# Patient Record
Sex: FEMALE | Race: BLACK OR AFRICAN AMERICAN | NOT HISPANIC OR LATINO | URBAN - METROPOLITAN AREA
[De-identification: names, ages, dates, MRNs, and addresses within clinical notes are randomized per-mention and may not be internally consistent; named-entity substitution may affect disease eponyms.]

---

## 2024-01-28 ENCOUNTER — HOSPITAL ENCOUNTER (INPATIENT)
Facility: MEDICAL CENTER | Age: 64
LOS: 2 days | DRG: 085 | End: 2024-01-31
Attending: EMERGENCY MEDICINE | Admitting: SURGERY
Payer: OTHER MISCELLANEOUS

## 2024-01-28 ENCOUNTER — APPOINTMENT (OUTPATIENT)
Dept: RADIOLOGY | Facility: MEDICAL CENTER | Age: 64
DRG: 085 | End: 2024-01-28
Attending: EMERGENCY MEDICINE
Payer: OTHER MISCELLANEOUS

## 2024-01-28 DIAGNOSIS — S06.5XAA SUBDURAL HEMATOMA (HCC): ICD-10-CM
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
## 2024-01-28 DIAGNOSIS — G44.319 ACUTE POST-TRAUMATIC HEADACHE, NOT INTRACTABLE: ICD-10-CM

## 2024-01-28 DIAGNOSIS — I60.9 SUBARACHNOID HEMORRHAGE (HCC): ICD-10-CM

## 2024-01-28 PROCEDURE — 80074 ACUTE HEPATITIS PANEL: CPT

## 2024-01-28 PROCEDURE — 86706 HEP B SURFACE ANTIBODY: CPT

## 2024-01-28 PROCEDURE — 87340 HEPATITIS B SURFACE AG IA: CPT

## 2024-01-28 PROCEDURE — 86705 HEP B CORE ANTIBODY IGM: CPT

## 2024-01-28 PROCEDURE — 86709 HEPATITIS A IGM ANTIBODY: CPT

## 2024-01-28 PROCEDURE — 86901 BLOOD TYPING SEROLOGIC RH(D): CPT

## 2024-01-28 PROCEDURE — 99291 CRITICAL CARE FIRST HOUR: CPT

## 2024-01-28 PROCEDURE — 36415 COLL VENOUS BLD VENIPUNCTURE: CPT

## 2024-01-28 PROCEDURE — 85025 COMPLETE CBC W/AUTO DIFF WBC: CPT

## 2024-01-28 PROCEDURE — 86900 BLOOD TYPING SEROLOGIC ABO: CPT

## 2024-01-28 PROCEDURE — 84703 CHORIONIC GONADOTROPIN ASSAY: CPT

## 2024-01-28 PROCEDURE — 86803 HEPATITIS C AB TEST: CPT

## 2024-01-29 ENCOUNTER — APPOINTMENT (OUTPATIENT)
Dept: RADIOLOGY | Facility: MEDICAL CENTER | Age: 64
DRG: 085 | End: 2024-01-29
Attending: EMERGENCY MEDICINE
Payer: OTHER MISCELLANEOUS

## 2024-01-29 ENCOUNTER — APPOINTMENT (OUTPATIENT)
Dept: RADIOLOGY | Facility: MEDICAL CENTER | Age: 64
DRG: 085 | End: 2024-01-29
Attending: NURSE PRACTITIONER
Payer: OTHER MISCELLANEOUS

## 2024-01-29 PROBLEM — H54.40 BLIND RIGHT EYE: Status: ACTIVE | Noted: 2024-01-29

## 2024-01-29 PROBLEM — Z89.611 HISTORY OF ABOVE-KNEE AMPUTATION OF RIGHT LOWER EXTREMITY (HCC): Status: ACTIVE | Noted: 2024-01-29

## 2024-01-29 PROBLEM — D69.59 PLATELET DYSFUNCTION DUE TO DRUGS: Status: ACTIVE | Noted: 2024-01-29

## 2024-01-29 PROBLEM — I69.30 HISTORY OF STROKE WITH RESIDUAL EFFECTS: Status: ACTIVE | Noted: 2024-01-29

## 2024-01-29 PROBLEM — N18.6 END STAGE RENAL DISEASE ON DIALYSIS (HCC): Status: ACTIVE | Noted: 2024-01-29

## 2024-01-29 PROBLEM — S78.111A ABOVE KNEE AMPUTATION OF RIGHT LOWER EXTREMITY (HCC): Status: ACTIVE | Noted: 2024-01-29

## 2024-01-29 PROBLEM — E11.9 TYPE 2 DIABETES MELLITUS (HCC): Status: ACTIVE | Noted: 2024-01-29

## 2024-01-29 PROBLEM — S06.5XAA SUBDURAL HEMATOMA, POST-TRAUMATIC (HCC): Status: ACTIVE | Noted: 2024-01-29

## 2024-01-29 PROBLEM — T14.90XA TRAUMA: Status: ACTIVE | Noted: 2024-01-29

## 2024-01-29 PROBLEM — T50.905A PLATELET DYSFUNCTION DUE TO DRUGS: Status: ACTIVE | Noted: 2024-01-29

## 2024-01-29 PROBLEM — Z99.2 END STAGE RENAL DISEASE ON DIALYSIS (HCC): Status: ACTIVE | Noted: 2024-01-29

## 2024-01-29 PROBLEM — I15.1 HYPERTENSION SECONDARY TO OTHER RENAL DISORDERS: Status: ACTIVE | Noted: 2024-01-29

## 2024-01-29 LAB
ABO GROUP BLD: NORMAL
ALBUMIN SERPL BCP-MCNC: 3.7 G/DL (ref 3.2–4.9)
ALBUMIN/GLOB SERPL: 1 G/DL
ALP SERPL-CCNC: 105 U/L (ref 30–99)
ALT SERPL-CCNC: 7 U/L (ref 2–50)
AMORPH CRY #/AREA URNS HPF: PRESENT /HPF
AMPHET UR QL SCN: NEGATIVE
ANION GAP SERPL CALC-SCNC: 17 MMOL/L (ref 7–16)
ANISOCYTOSIS BLD QL SMEAR: ABNORMAL
APPEARANCE UR: CLEAR
APTT PPP: 32.8 SEC (ref 24.7–36)
AST SERPL-CCNC: 13 U/L (ref 12–45)
BACTERIA #/AREA URNS HPF: NEGATIVE /HPF
BARBITURATES UR QL SCN: NEGATIVE
BARCODED ABORH UBTYP: 5100
BARCODED PRD CODE UBPRD: NORMAL
BARCODED UNIT NUM UBUNT: NORMAL
BASOPHILS # BLD AUTO: 0.4 % (ref 0–1.8)
BASOPHILS # BLD: 0.02 K/UL (ref 0–0.12)
BENZODIAZ UR QL SCN: NEGATIVE
BILIRUB SERPL-MCNC: 0.4 MG/DL (ref 0.1–1.5)
BILIRUB UR QL STRIP.AUTO: NEGATIVE
BUN SERPL-MCNC: 35 MG/DL (ref 8–22)
BZE UR QL SCN: NEGATIVE
CALCIUM ALBUM COR SERPL-MCNC: 9.6 MG/DL (ref 8.5–10.5)
CALCIUM SERPL-MCNC: 9.4 MG/DL (ref 8.5–10.5)
CANNABINOIDS UR QL SCN: NEGATIVE
CFT BLD TEG: 4.7 MIN (ref 4.6–9.1)
CFT BLD TEG: 5.4 MIN (ref 4.6–9.1)
CFT P HPASE BLD TEG: 4.4 MIN (ref 4.3–8.3)
CFT P HPASE BLD TEG: 5.6 MIN (ref 4.3–8.3)
CHLORIDE SERPL-SCNC: 99 MMOL/L (ref 96–112)
CLOT ANGLE BLD TEG: 77.3 DEGREES (ref 63–78)
CLOT ANGLE BLD TEG: 77.9 DEGREES (ref 63–78)
CLOT LYSIS 30M P MA LENFR BLD TEG: 0.5 % (ref 0–2.6)
CO2 SERPL-SCNC: 27 MMOL/L (ref 20–33)
COLOR UR: YELLOW
COMMENT 1642: NORMAL
COMPONENT P 8504P: NORMAL
CREAT SERPL-MCNC: 5.26 MG/DL (ref 0.5–1.4)
CT.EXTRINSIC BLD ROTEM: 0.8 MIN (ref 0.8–2.1)
CT.EXTRINSIC BLD ROTEM: 0.9 MIN (ref 0.8–2.1)
EOSINOPHIL # BLD AUTO: 0.13 K/UL (ref 0–0.51)
EOSINOPHIL NFR BLD: 2.8 % (ref 0–6.9)
EPI CELLS #/AREA URNS HPF: ABNORMAL /HPF
ERYTHROCYTE [DISTWIDTH] IN BLOOD BY AUTOMATED COUNT: 68.5 FL (ref 35.9–50)
ETHANOL BLD-MCNC: <10.1 MG/DL
FENTANYL UR QL: NEGATIVE
GFR SERPLBLD CREATININE-BSD FMLA CKD-EPI: 9 ML/MIN/1.73 M 2
GLOBULIN SER CALC-MCNC: 3.8 G/DL (ref 1.9–3.5)
GLUCOSE BLD STRIP.AUTO-MCNC: 107 MG/DL (ref 65–99)
GLUCOSE BLD STRIP.AUTO-MCNC: 152 MG/DL (ref 65–99)
GLUCOSE BLD STRIP.AUTO-MCNC: 161 MG/DL (ref 65–99)
GLUCOSE BLD STRIP.AUTO-MCNC: 79 MG/DL (ref 65–99)
GLUCOSE BLD STRIP.AUTO-MCNC: 98 MG/DL (ref 65–99)
GLUCOSE SERPL-MCNC: 164 MG/DL (ref 65–99)
GLUCOSE UR STRIP.AUTO-MCNC: NEGATIVE MG/DL
HAV IGM SERPL QL IA: NORMAL
HBV SURFACE AB SERPL IA-ACNC: <3.5 MIU/ML (ref 0–10)
HBV SURFACE AG SER QL: NORMAL
HCG SERPL QL: NEGATIVE
HCT VFR BLD AUTO: 28.6 % (ref 37–47)
HCV AB SER QL: NORMAL
HGB BLD-MCNC: 8.7 G/DL (ref 12–16)
HYALINE CASTS #/AREA URNS LPF: ABNORMAL /LPF
HYPOCHROMIA BLD QL SMEAR: ABNORMAL
IMM GRANULOCYTES # BLD AUTO: 0.02 K/UL (ref 0–0.11)
IMM GRANULOCYTES NFR BLD AUTO: 0.4 % (ref 0–0.9)
INR PPP: 1.02 (ref 0.87–1.13)
KETONES UR STRIP.AUTO-MCNC: NEGATIVE MG/DL
LEUKOCYTE ESTERASE UR QL STRIP.AUTO: ABNORMAL
LYMPHOCYTES # BLD AUTO: 0.79 K/UL (ref 1–4.8)
LYMPHOCYTES NFR BLD: 17.1 % (ref 22–41)
MACROCYTES BLD QL SMEAR: ABNORMAL
MAGNESIUM SERPL-MCNC: 2.5 MG/DL (ref 1.5–2.5)
MCF BLD TEG: 60.8 MM (ref 52–69)
MCF BLD TEG: 63.7 MM (ref 52–69)
MCF.PLATELET INHIB BLD ROTEM: 25.3 MM (ref 15–32)
MCF.PLATELET INHIB BLD ROTEM: 26.1 MM (ref 15–32)
MCH RBC QN AUTO: 27.2 PG (ref 27–33)
MCHC RBC AUTO-ENTMCNC: 30.4 G/DL (ref 32.2–35.5)
MCV RBC AUTO: 89.4 FL (ref 81.4–97.8)
METHADONE UR QL SCN: NEGATIVE
MICRO URNS: ABNORMAL
MICROCYTES BLD QL SMEAR: ABNORMAL
MONOCYTES # BLD AUTO: 0.39 K/UL (ref 0–0.85)
MONOCYTES NFR BLD AUTO: 8.4 % (ref 0–13.4)
MORPHOLOGY BLD-IMP: NORMAL
NEUTROPHILS # BLD AUTO: 3.28 K/UL (ref 1.82–7.42)
NEUTROPHILS NFR BLD: 70.9 % (ref 44–72)
NITRITE UR QL STRIP.AUTO: NEGATIVE
NRBC # BLD AUTO: 0 K/UL
NRBC BLD-RTO: 0 /100 WBC (ref 0–0.2)
OPIATES UR QL SCN: NEGATIVE
OVALOCYTES BLD QL SMEAR: NORMAL
OXYCODONE UR QL SCN: NEGATIVE
PA AA BLD-ACNC: 11.3 % (ref 0–11)
PA AA BLD-ACNC: 48.9 % (ref 0–11)
PA ADP BLD-ACNC: 11.7 % (ref 0–17)
PA ADP BLD-ACNC: 12.7 % (ref 0–17)
PCP UR QL SCN: NEGATIVE
PH UR STRIP.AUTO: 8 [PH] (ref 5–8)
PHOSPHATE SERPL-MCNC: 7 MG/DL (ref 2.5–4.5)
PLATELET # BLD AUTO: 136 K/UL (ref 164–446)
PLATELET BLD QL SMEAR: NORMAL
PMV BLD AUTO: 9.5 FL (ref 9–12.9)
POIKILOCYTOSIS BLD QL SMEAR: NORMAL
POTASSIUM SERPL-SCNC: 3.7 MMOL/L (ref 3.6–5.5)
PRODUCT TYPE UPROD: NORMAL
PROPOXYPH UR QL SCN: NEGATIVE
PROT SERPL-MCNC: 7.5 G/DL (ref 6–8.2)
PROT UR QL STRIP: 300 MG/DL
PROTHROMBIN TIME: 13.5 SEC (ref 12–14.6)
RBC # BLD AUTO: 3.2 M/UL (ref 4.2–5.4)
RBC # URNS HPF: ABNORMAL /HPF
RBC BLD AUTO: PRESENT
RBC UR QL AUTO: NEGATIVE
RENAL EPI CELLS #/AREA URNS HPF: ABNORMAL /HPF
RH BLD: NORMAL
SCHISTOCYTES BLD QL SMEAR: NORMAL
SODIUM SERPL-SCNC: 143 MMOL/L (ref 135–145)
SP GR UR STRIP.AUTO: 1.01
TEG ALGORITHM TGALG: ABNORMAL
TEG ALGORITHM TGALG: ABNORMAL
UNIT STATUS USTAT: NORMAL
UROBILINOGEN UR STRIP.AUTO-MCNC: 0.2 MG/DL
WBC # BLD AUTO: 4.6 K/UL (ref 4.8–10.8)
WBC #/AREA URNS HPF: ABNORMAL /HPF

## 2024-01-29 PROCEDURE — P9034 PLATELETS, PHERESIS: HCPCS

## 2024-01-29 PROCEDURE — 5A1D70Z PERFORMANCE OF URINARY FILTRATION, INTERMITTENT, LESS THAN 6 HOURS PER DAY: ICD-10-PCS | Performed by: INTERNAL MEDICINE

## 2024-01-29 PROCEDURE — 99222 1ST HOSP IP/OBS MODERATE 55: CPT | Performed by: SURGERY

## 2024-01-29 PROCEDURE — 97530 THERAPEUTIC ACTIVITIES: CPT

## 2024-01-29 PROCEDURE — 30233R1 TRANSFUSION OF NONAUTOLOGOUS PLATELETS INTO PERIPHERAL VEIN, PERCUTANEOUS APPROACH: ICD-10-PCS | Performed by: INTERNAL MEDICINE

## 2024-01-29 PROCEDURE — 71045 X-RAY EXAM CHEST 1 VIEW: CPT

## 2024-01-29 PROCEDURE — 85610 PROTHROMBIN TIME: CPT

## 2024-01-29 PROCEDURE — 84100 ASSAY OF PHOSPHORUS: CPT

## 2024-01-29 PROCEDURE — A9270 NON-COVERED ITEM OR SERVICE: HCPCS | Performed by: SURGERY

## 2024-01-29 PROCEDURE — 700102 HCHG RX REV CODE 250 W/ 637 OVERRIDE(OP): Performed by: NURSE PRACTITIONER

## 2024-01-29 PROCEDURE — 700111 HCHG RX REV CODE 636 W/ 250 OVERRIDE (IP): Performed by: SURGERY

## 2024-01-29 PROCEDURE — 700102 HCHG RX REV CODE 250 W/ 637 OVERRIDE(OP): Performed by: SURGERY

## 2024-01-29 PROCEDURE — 83735 ASSAY OF MAGNESIUM: CPT

## 2024-01-29 PROCEDURE — 770022 HCHG ROOM/CARE - ICU (200)

## 2024-01-29 PROCEDURE — 85730 THROMBOPLASTIN TIME PARTIAL: CPT

## 2024-01-29 PROCEDURE — 96375 TX/PRO/DX INJ NEW DRUG ADDON: CPT

## 2024-01-29 PROCEDURE — 96374 THER/PROPH/DIAG INJ IV PUSH: CPT

## 2024-01-29 PROCEDURE — 72125 CT NECK SPINE W/O DYE: CPT

## 2024-01-29 PROCEDURE — 36415 COLL VENOUS BLD VENIPUNCTURE: CPT

## 2024-01-29 PROCEDURE — 97163 PT EVAL HIGH COMPLEX 45 MIN: CPT

## 2024-01-29 PROCEDURE — 80307 DRUG TEST PRSMV CHEM ANLYZR: CPT

## 2024-01-29 PROCEDURE — 36430 TRANSFUSION BLD/BLD COMPNT: CPT

## 2024-01-29 PROCEDURE — 700111 HCHG RX REV CODE 636 W/ 250 OVERRIDE (IP): Mod: JZ | Performed by: EMERGENCY MEDICINE

## 2024-01-29 PROCEDURE — 70450 CT HEAD/BRAIN W/O DYE: CPT

## 2024-01-29 PROCEDURE — 97167 OT EVAL HIGH COMPLEX 60 MIN: CPT

## 2024-01-29 PROCEDURE — 81001 URINALYSIS AUTO W/SCOPE: CPT

## 2024-01-29 PROCEDURE — 85384 FIBRINOGEN ACTIVITY: CPT | Mod: 91

## 2024-01-29 PROCEDURE — A9270 NON-COVERED ITEM OR SERVICE: HCPCS | Performed by: NURSE PRACTITIONER

## 2024-01-29 PROCEDURE — 92610 EVALUATE SWALLOWING FUNCTION: CPT

## 2024-01-29 PROCEDURE — 85347 COAGULATION TIME ACTIVATED: CPT

## 2024-01-29 PROCEDURE — 82962 GLUCOSE BLOOD TEST: CPT

## 2024-01-29 PROCEDURE — 700111 HCHG RX REV CODE 636 W/ 250 OVERRIDE (IP): Performed by: NURSE PRACTITIONER

## 2024-01-29 PROCEDURE — 82077 ASSAY SPEC XCP UR&BREATH IA: CPT

## 2024-01-29 PROCEDURE — 80053 COMPREHEN METABOLIC PANEL: CPT

## 2024-01-29 PROCEDURE — 85576 BLOOD PLATELET AGGREGATION: CPT | Mod: 91

## 2024-01-29 RX ORDER — LORAZEPAM 2 MG/ML
0.5 INJECTION INTRAMUSCULAR ONCE
Status: COMPLETED | OUTPATIENT
Start: 2024-01-29 | End: 2024-01-29

## 2024-01-29 RX ORDER — HYDROCHLOROTHIAZIDE 25 MG/1
25 TABLET ORAL
Status: DISCONTINUED | OUTPATIENT
Start: 2024-01-29 | End: 2024-01-31 | Stop reason: HOSPADM

## 2024-01-29 RX ORDER — AMOXICILLIN 250 MG
1 CAPSULE ORAL NIGHTLY
Status: DISCONTINUED | OUTPATIENT
Start: 2024-01-29 | End: 2024-01-31 | Stop reason: HOSPADM

## 2024-01-29 RX ORDER — PANTOPRAZOLE SODIUM 20 MG/1
20 TABLET, DELAYED RELEASE ORAL DAILY
COMMUNITY

## 2024-01-29 RX ORDER — ONDANSETRON 2 MG/ML
4 INJECTION INTRAMUSCULAR; INTRAVENOUS EVERY 4 HOURS PRN
Status: DISCONTINUED | OUTPATIENT
Start: 2024-01-29 | End: 2024-01-31 | Stop reason: HOSPADM

## 2024-01-29 RX ORDER — SEVELAMER CARBONATE 800 MG/1
1600 TABLET, FILM COATED ORAL
COMMUNITY

## 2024-01-29 RX ORDER — HYDROCHLOROTHIAZIDE 25 MG/1
25 TABLET ORAL DAILY
COMMUNITY

## 2024-01-29 RX ORDER — ONDANSETRON 2 MG/ML
4 INJECTION INTRAMUSCULAR; INTRAVENOUS ONCE
Status: COMPLETED | OUTPATIENT
Start: 2024-01-29 | End: 2024-01-29

## 2024-01-29 RX ORDER — DOCUSATE SODIUM 250 MG
250-500 CAPSULE ORAL
COMMUNITY

## 2024-01-29 RX ORDER — BISACODYL 10 MG
10 SUPPOSITORY, RECTAL RECTAL
Status: DISCONTINUED | OUTPATIENT
Start: 2024-01-29 | End: 2024-01-31 | Stop reason: HOSPADM

## 2024-01-29 RX ORDER — MORPHINE SULFATE 4 MG/ML
4 INJECTION INTRAVENOUS ONCE
Status: COMPLETED | OUTPATIENT
Start: 2024-01-29 | End: 2024-01-29

## 2024-01-29 RX ORDER — LATANOPROST 50 UG/ML
1 SOLUTION/ DROPS OPHTHALMIC EVERY EVENING
Status: DISCONTINUED | OUTPATIENT
Start: 2024-01-29 | End: 2024-01-31 | Stop reason: HOSPADM

## 2024-01-29 RX ORDER — POLYETHYLENE GLYCOL 3350 17 G/17G
1 POWDER, FOR SOLUTION ORAL 2 TIMES DAILY
Status: DISCONTINUED | OUTPATIENT
Start: 2024-01-29 | End: 2024-01-31 | Stop reason: HOSPADM

## 2024-01-29 RX ORDER — FLUCONAZOLE 150 MG/1
150 TABLET ORAL ONCE
Status: ON HOLD | COMMUNITY
End: 2024-01-31

## 2024-01-29 RX ORDER — OXYCODONE HYDROCHLORIDE 5 MG/1
2.5 TABLET ORAL
Status: DISCONTINUED | OUTPATIENT
Start: 2024-01-29 | End: 2024-01-31 | Stop reason: HOSPADM

## 2024-01-29 RX ORDER — ENEMA 19; 7 G/133ML; G/133ML
1 ENEMA RECTAL
Status: DISCONTINUED | OUTPATIENT
Start: 2024-01-29 | End: 2024-01-29

## 2024-01-29 RX ORDER — OXYCODONE HYDROCHLORIDE 5 MG/1
5 TABLET ORAL
Status: DISCONTINUED | OUTPATIENT
Start: 2024-01-29 | End: 2024-01-31 | Stop reason: HOSPADM

## 2024-01-29 RX ORDER — ACETAMINOPHEN 325 MG/1
650 TABLET ORAL EVERY 4 HOURS PRN
Status: DISCONTINUED | OUTPATIENT
Start: 2024-01-29 | End: 2024-01-31 | Stop reason: HOSPADM

## 2024-01-29 RX ORDER — ATORVASTATIN CALCIUM 40 MG/1
40 TABLET, FILM COATED ORAL EVERY EVENING
Status: DISCONTINUED | OUTPATIENT
Start: 2024-01-29 | End: 2024-01-31 | Stop reason: HOSPADM

## 2024-01-29 RX ORDER — ACETAMINOPHEN 500 MG
1000 TABLET ORAL EVERY 6 HOURS PRN
COMMUNITY

## 2024-01-29 RX ORDER — LEVETIRACETAM 500 MG/5ML
500 INJECTION, SOLUTION, CONCENTRATE INTRAVENOUS EVERY 24 HOURS
Status: DISCONTINUED | OUTPATIENT
Start: 2024-01-29 | End: 2024-01-30

## 2024-01-29 RX ORDER — LEVETIRACETAM 500 MG/1
500 TABLET ORAL EVERY 24 HOURS
Status: DISCONTINUED | OUTPATIENT
Start: 2024-01-29 | End: 2024-01-30

## 2024-01-29 RX ORDER — BRIMONIDINE TARTRATE 2 MG/ML
1 SOLUTION/ DROPS OPHTHALMIC EVERY 8 HOURS
Status: DISCONTINUED | OUTPATIENT
Start: 2024-01-29 | End: 2024-01-31 | Stop reason: HOSPADM

## 2024-01-29 RX ORDER — HYDROMORPHONE HYDROCHLORIDE 1 MG/ML
0.25 INJECTION, SOLUTION INTRAMUSCULAR; INTRAVENOUS; SUBCUTANEOUS
Status: DISCONTINUED | OUTPATIENT
Start: 2024-01-29 | End: 2024-01-30

## 2024-01-29 RX ORDER — METOPROLOL SUCCINATE 100 MG/1
100 TABLET, EXTENDED RELEASE ORAL DAILY
COMMUNITY

## 2024-01-29 RX ORDER — DOCUSATE SODIUM 100 MG/1
100 CAPSULE, LIQUID FILLED ORAL 2 TIMES DAILY
Status: DISCONTINUED | OUTPATIENT
Start: 2024-01-29 | End: 2024-01-31 | Stop reason: HOSPADM

## 2024-01-29 RX ORDER — AMLODIPINE BESYLATE 10 MG/1
10 TABLET ORAL DAILY
COMMUNITY

## 2024-01-29 RX ORDER — HYDRALAZINE HYDROCHLORIDE 20 MG/ML
10 INJECTION INTRAMUSCULAR; INTRAVENOUS EVERY 4 HOURS PRN
Status: DISCONTINUED | OUTPATIENT
Start: 2024-01-29 | End: 2024-01-31 | Stop reason: HOSPADM

## 2024-01-29 RX ORDER — METOPROLOL SUCCINATE 25 MG/1
100 TABLET, EXTENDED RELEASE ORAL
Status: DISCONTINUED | OUTPATIENT
Start: 2024-01-29 | End: 2024-01-31 | Stop reason: HOSPADM

## 2024-01-29 RX ORDER — ATORVASTATIN CALCIUM 40 MG/1
40 TABLET, FILM COATED ORAL NIGHTLY
COMMUNITY

## 2024-01-29 RX ORDER — AMOXICILLIN 250 MG
1 CAPSULE ORAL
Status: DISCONTINUED | OUTPATIENT
Start: 2024-01-29 | End: 2024-01-31 | Stop reason: HOSPADM

## 2024-01-29 RX ORDER — CLOPIDOGREL BISULFATE 75 MG/1
75 TABLET ORAL DAILY
Status: ON HOLD | COMMUNITY
End: 2024-01-31

## 2024-01-29 RX ORDER — DORZOLAMIDE HYDROCHLORIDE AND TIMOLOL MALEATE 20; 5 MG/ML; MG/ML
1 SOLUTION/ DROPS OPHTHALMIC 2 TIMES DAILY
COMMUNITY

## 2024-01-29 RX ORDER — ASPIRIN 81 MG/1
81 TABLET, CHEWABLE ORAL DAILY
Status: ON HOLD | COMMUNITY
End: 2024-01-31

## 2024-01-29 RX ORDER — GABAPENTIN 100 MG/1
100 CAPSULE ORAL 3 TIMES DAILY
COMMUNITY

## 2024-01-29 RX ORDER — DORZOLAMIDE HYDROCHLORIDE AND TIMOLOL MALEATE 20; 5 MG/ML; MG/ML
1 SOLUTION/ DROPS OPHTHALMIC 2 TIMES DAILY
Status: DISCONTINUED | OUTPATIENT
Start: 2024-01-29 | End: 2024-01-31 | Stop reason: HOSPADM

## 2024-01-29 RX ORDER — HYDRALAZINE HYDROCHLORIDE 25 MG/1
25 TABLET, FILM COATED ORAL 2 TIMES DAILY
COMMUNITY

## 2024-01-29 RX ORDER — AMLODIPINE BESYLATE 5 MG/1
10 TABLET ORAL
Status: DISCONTINUED | OUTPATIENT
Start: 2024-01-29 | End: 2024-01-31 | Stop reason: HOSPADM

## 2024-01-29 RX ORDER — SEVELAMER CARBONATE 800 MG/1
800 TABLET, FILM COATED ORAL
Status: DISCONTINUED | OUTPATIENT
Start: 2024-01-29 | End: 2024-01-31 | Stop reason: HOSPADM

## 2024-01-29 RX ORDER — ONDANSETRON 4 MG/1
4 TABLET, ORALLY DISINTEGRATING ORAL EVERY 4 HOURS PRN
Status: DISCONTINUED | OUTPATIENT
Start: 2024-01-29 | End: 2024-01-31 | Stop reason: HOSPADM

## 2024-01-29 RX ORDER — BRIMONIDINE TARTRATE 2 MG/ML
1 SOLUTION/ DROPS OPHTHALMIC 3 TIMES DAILY
COMMUNITY

## 2024-01-29 RX ORDER — LATANOPROST 50 UG/ML
1 SOLUTION/ DROPS OPHTHALMIC NIGHTLY
COMMUNITY

## 2024-01-29 RX ORDER — DEXTROSE MONOHYDRATE 25 G/50ML
25 INJECTION, SOLUTION INTRAVENOUS
Status: DISCONTINUED | OUTPATIENT
Start: 2024-01-29 | End: 2024-01-30

## 2024-01-29 RX ORDER — GABAPENTIN 100 MG/1
100 CAPSULE ORAL 3 TIMES DAILY
Status: DISCONTINUED | OUTPATIENT
Start: 2024-01-29 | End: 2024-01-31 | Stop reason: HOSPADM

## 2024-01-29 RX ORDER — POLYETHYLENE GLYCOL 3350 17 G/17G
17 POWDER, FOR SOLUTION ORAL
COMMUNITY

## 2024-01-29 RX ORDER — HYDRALAZINE HYDROCHLORIDE 25 MG/1
25 TABLET, FILM COATED ORAL 2 TIMES DAILY
Status: DISCONTINUED | OUTPATIENT
Start: 2024-01-29 | End: 2024-01-31 | Stop reason: HOSPADM

## 2024-01-29 RX ADMIN — LORAZEPAM 0.5 MG: 2 INJECTION INTRAMUSCULAR; INTRAVENOUS at 21:17

## 2024-01-29 RX ADMIN — HYDRALAZINE HYDROCHLORIDE 25 MG: 25 TABLET ORAL at 14:13

## 2024-01-29 RX ADMIN — DORZOLAMIDE HYDROCHLORIDE AND TIMOLOL MALEATE 1 DROP: 20; 5 SOLUTION/ DROPS OPHTHALMIC at 14:14

## 2024-01-29 RX ADMIN — BRIMONIDINE TARTRATE 1 DROP: 2 SOLUTION OPHTHALMIC at 14:14

## 2024-01-29 RX ADMIN — DOCUSATE SODIUM 100 MG: 100 CAPSULE, LIQUID FILLED ORAL at 17:59

## 2024-01-29 RX ADMIN — LEVETIRACETAM 500 MG: 500 TABLET, FILM COATED ORAL at 17:59

## 2024-01-29 RX ADMIN — AMLODIPINE BESYLATE 10 MG: 10 TABLET ORAL at 14:16

## 2024-01-29 RX ADMIN — LATANOPROST 1 DROP: 50 SOLUTION OPHTHALMIC at 18:25

## 2024-01-29 RX ADMIN — LEVETIRACETAM 500 MG: 500 TABLET, FILM COATED ORAL at 03:07

## 2024-01-29 RX ADMIN — GABAPENTIN 100 MG: 100 CAPSULE ORAL at 17:59

## 2024-01-29 RX ADMIN — ATORVASTATIN CALCIUM 40 MG: 40 TABLET, FILM COATED ORAL at 17:59

## 2024-01-29 RX ADMIN — SEVELAMER CARBONATE 800 MG: 800 TABLET, FILM COATED ORAL at 14:13

## 2024-01-29 RX ADMIN — MORPHINE SULFATE 4 MG: 4 INJECTION, SOLUTION INTRAMUSCULAR; INTRAVENOUS at 01:07

## 2024-01-29 RX ADMIN — DOCUSATE SODIUM 50 MG AND SENNOSIDES 8.6 MG 1 TABLET: 8.6; 5 TABLET, FILM COATED ORAL at 03:07

## 2024-01-29 RX ADMIN — ONDANSETRON 4 MG: 2 INJECTION INTRAMUSCULAR; INTRAVENOUS at 01:07

## 2024-01-29 RX ADMIN — SEVELAMER CARBONATE 800 MG: 800 TABLET, FILM COATED ORAL at 17:59

## 2024-01-29 RX ADMIN — HYDROCHLOROTHIAZIDE 25 MG: 25 TABLET ORAL at 14:14

## 2024-01-29 RX ADMIN — GABAPENTIN 100 MG: 100 CAPSULE ORAL at 14:13

## 2024-01-29 RX ADMIN — HYDRALAZINE HYDROCHLORIDE 10 MG: 20 INJECTION, SOLUTION INTRAMUSCULAR; INTRAVENOUS at 08:51

## 2024-01-29 ASSESSMENT — ACTIVITIES OF DAILY LIVING (ADL): TOILETING: DEPENDENT

## 2024-01-29 ASSESSMENT — ENCOUNTER SYMPTOMS
TREMORS: 0
SHORTNESS OF BREATH: 0
SPEECH CHANGE: 0
FEVER: 0
ABDOMINAL PAIN: 0
HEADACHES: 0
BLURRED VISION: 0
MYALGIAS: 0
VOMITING: 0
COUGH: 0
NAUSEA: 0
CHILLS: 0
TINGLING: 0
SENSORY CHANGE: 0

## 2024-01-29 ASSESSMENT — COGNITIVE AND FUNCTIONAL STATUS - GENERAL
SUGGESTED CMS G CODE MODIFIER MOBILITY: CM
DRESSING REGULAR LOWER BODY CLOTHING: A LOT
DRESSING REGULAR LOWER BODY CLOTHING: A LOT
DRESSING REGULAR UPPER BODY CLOTHING: A LOT
TURNING FROM BACK TO SIDE WHILE IN FLAT BAD: A LOT
MOVING FROM LYING ON BACK TO SITTING ON SIDE OF FLAT BED: UNABLE
TURNING FROM BACK TO SIDE WHILE IN FLAT BAD: A LOT
CLIMB 3 TO 5 STEPS WITH RAILING: TOTAL
PERSONAL GROOMING: A LITTLE
WALKING IN HOSPITAL ROOM: TOTAL
DRESSING REGULAR UPPER BODY CLOTHING: A LOT
TOILETING: A LOT
MOVING TO AND FROM BED TO CHAIR: UNABLE
PERSONAL GROOMING: A LITTLE
DAILY ACTIVITIY SCORE: 14
WALKING IN HOSPITAL ROOM: TOTAL
CLIMB 3 TO 5 STEPS WITH RAILING: TOTAL
MOBILITY SCORE: 7
MOVING FROM LYING ON BACK TO SITTING ON SIDE OF FLAT BED: UNABLE
SUGGESTED CMS G CODE MODIFIER MOBILITY: CM
EATING MEALS: A LITTLE
MOBILITY SCORE: 8
STANDING UP FROM CHAIR USING ARMS: A LOT
TOILETING: A LOT
HELP NEEDED FOR BATHING: A LOT
HELP NEEDED FOR BATHING: A LOT
DAILY ACTIVITIY SCORE: 14
STANDING UP FROM CHAIR USING ARMS: TOTAL
MOVING TO AND FROM BED TO CHAIR: UNABLE
SUGGESTED CMS G CODE MODIFIER DAILY ACTIVITY: CK
SUGGESTED CMS G CODE MODIFIER DAILY ACTIVITY: CK
EATING MEALS: A LITTLE

## 2024-01-29 ASSESSMENT — PAIN DESCRIPTION - PAIN TYPE
TYPE: ACUTE PAIN

## 2024-01-29 ASSESSMENT — PATIENT HEALTH QUESTIONNAIRE - PHQ9
1. LITTLE INTEREST OR PLEASURE IN DOING THINGS: NOT AT ALL
SUM OF ALL RESPONSES TO PHQ9 QUESTIONS 1 AND 2: 0
2. FEELING DOWN, DEPRESSED, IRRITABLE, OR HOPELESS: NOT AT ALL

## 2024-01-29 ASSESSMENT — LIFESTYLE VARIABLES
TOTAL SCORE: 0
ALCOHOL_USE: YES
CONSUMPTION TOTAL: NEGATIVE
AVERAGE NUMBER OF DAYS PER WEEK YOU HAVE A DRINK CONTAINING ALCOHOL: 0
EVER FELT BAD OR GUILTY ABOUT YOUR DRINKING: NO
ON A TYPICAL DAY WHEN YOU DRINK ALCOHOL HOW MANY DRINKS DO YOU HAVE: 0
TOTAL SCORE: 0
HOW MANY TIMES IN THE PAST YEAR HAVE YOU HAD 5 OR MORE DRINKS IN A DAY: 0
TOTAL SCORE: 0
HAVE YOU EVER FELT YOU SHOULD CUT DOWN ON YOUR DRINKING: NO
EVER HAD A DRINK FIRST THING IN THE MORNING TO STEADY YOUR NERVES TO GET RID OF A HANGOVER: NO
DOES PATIENT WANT TO STOP DRINKING: NO
HAVE PEOPLE ANNOYED YOU BY CRITICIZING YOUR DRINKING: NO

## 2024-01-29 ASSESSMENT — COPD QUESTIONNAIRES
HAVE YOU SMOKED AT LEAST 100 CIGARETTES IN YOUR ENTIRE LIFE: NO/DON'T KNOW
DO YOU EVER COUGH UP ANY MUCUS OR PHLEGM?: NO/ONLY WITH OCCASIONAL COLDS OR INFECTIONS
COPD SCREENING SCORE: 2
DURING THE PAST 4 WEEKS HOW MUCH DID YOU FEEL SHORT OF BREATH: NONE/LITTLE OF THE TIME

## 2024-01-29 ASSESSMENT — FIBROSIS 4 INDEX: FIB4 SCORE: 2.24

## 2024-01-29 ASSESSMENT — GAIT ASSESSMENTS: GAIT LEVEL OF ASSIST: UNABLE TO PARTICIPATE

## 2024-01-29 NOTE — ASSESSMENT & PLAN NOTE
Left hemispheric subdural hematoma. Right sylvian and temporal subarachnoid hemorrhage as well as more extensive left frontal subarachnoid hemorrhage.  No significant midline shift.  Repeat head CT with mildly increased size of a left posterior predominant subdural hematoma   Plan repeat head CT in 24-36 hours  Non-operative management.  Post traumatic pharmacologic seizure prophylaxis for 1 week.  Speech Language Pathology cognitive evaluation.  Felix Stevens MD. Neurosurgeon. [facility].
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
## 2024-01-29 NOTE — PROGRESS NOTES
Medication history reviewed with PT at bedside.  PT's Daughter, Grabiel ([phone]) over the phone.  PT's Home Pharmacy [facility] in [place], [place] ([phone]) over the phone.    Med rec is complete per PT, Daughter and [facility] reporting.    Allergies reviewed with Daughter over the phone.    [facility] denies any outpatient antibiotics in the last 30 days.     Patient is not taking anticoagulants.    PT is on dialysis on Tues, Thurs and Sat at [facility] in [place], [place] ([phone]). PT's daughter reports that PT received dialysis on 01/27/2024.    PT's Daughter could only verify that the PT took 5 of her medications (Amlodipine, Aspirin, Atorvastatin, Hydralazine and Metoprolol) on 01/28/2024.  PT would only confirm that she took her eye drops on 01/28/2024. At that time, PT would no longer participate in Med Rec interview and demanded that the RN, PT's sister and this writer leave her room immediately.    Preferred pharmacy for this visit - Unknown
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
## 2024-01-29 NOTE — PROGRESS NOTES
Patient wearing bracelet and ring and did not want them removed.   Ziploc bag containing medications and other rings and necklace sent home with patient's daughter Grabiel.

## 2024-01-29 NOTE — ASSESSMENT & PLAN NOTE
Chronic condition treated with Norvasc, hydralazine and hydrochlorothiazide.  Resumed maintenance medication on admission.  IV prn antihypertensives ordered.
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
## 2024-01-29 NOTE — ED NOTES
Pt with improvement in mentation. Pt AAOx3; disoriented to situation. Pt re-oriented by daughters at bedside.

## 2024-01-29 NOTE — CARE PLAN
Problem: Knowledge Deficit - Standard  Goal: Patient and family/care givers will demonstrate understanding of plan of care, disease process/condition, diagnostic tests and medications  Outcome: Progressing  Note: Explained plan of care to patient and daughter     Problem: Hemodynamics  Goal: Patient's hemodynamics, fluid balance and neurologic status will be stable or improve  Outcome: Progressing   The patient is Stable - Low risk of patient condition declining or worsening    Shift Goals  Clinical Goals: stable neuro exam  Patient Goals: rest  Family Goals: no family present    Progress made toward(s) clinical / shift goals:  neuro exam at baseline, VSS    Patient is not progressing towards the following goals: n/a
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
## 2024-01-29 NOTE — PROGRESS NOTES
Neurosurgery    Reviewed CT with Dr. Stevens- sdh/sah stable  Neuro checks q2  Continue Keppra  No anticoags  Activity as beatriz
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
## 2024-01-29 NOTE — ASSESSMENT & PLAN NOTE
History of, no medications noted in medication reconciliation   Daughter is a primary caregiver.   Insulin sliding scale.
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
## 2024-01-29 NOTE — PROGRESS NOTES
Pt to T931 on on gurney & cardiac monitor w/ RN and CNA.    Weight: 63.5kg  Temp: 97.0F  HR: 60  BP: 145/71  O2: 96% on 1L NC  RR: 14    Pt belongings: wheelchair, dentures, cell phone, credit card, shoes, black purse, snacks, jewelry (x3 rings, necklace)    4 Eyes Skin Assessment Completed by JASON Umana and JASON Cordova.    Head small bruise to R anterior head  Ears WDL  Nose WDL  Mouth WDL  Neck WDL  Breast/Chest WDL  Shoulder Blades WDL  Spine WDL  (R) Arm/Elbow/Hand small abrasion to R FA  (L) Arm/Elbow/Hand WDL, AV fistula  Abdomen scattered scars  Groin WDL  Scrotum/Coccyx/Buttocks healed scar on coccyx  (R) Leg AKA  (L) Leg scattered scars on shin  (R) Heel/Foot/Toe N/A  (L) Heel/Foot/Toe scars on foot          Devices In Places Blood Pressure Cuff, Pulse Ox, SCD's, and Nasal Cannula      Interventions In Place NC W/Ear Foams, Sacral Mepilex, TAP System, Pillows, Q2 Turns, and Low Air Loss Mattress    Possible Skin Injury No    Pictures Uploaded Into Epic N/A  Wound Consult Placed N/A  RN Wound Prevention Protocol Ordered Yes

## 2024-01-29 NOTE — ED NOTES
Straight cath performed, pt tolerated well. Urine collected and sent to lab.     Pt denies any needs at this time. NAD noted. VSS. Call light within reach

## 2024-01-29 NOTE — ASSESSMENT & PLAN NOTE
Hemodialysis on Sat, Tue, and Thursdays in [place].  Left arm fistula.  1/29 Case discussed with Dr. Reyes, will follow
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
## 2024-01-29 NOTE — ED NOTES
ERP at bedside discussing POC/results. Pt denies any needs at this time. NAD noted. Respirations even and unlabored. Bed alarm on, call light within reach

## 2024-01-29 NOTE — PROGRESS NOTES
Home pharmacy entered in med rec. Will need to call in am to get current medications. Patient's daughter is unsure of doses.

## 2024-01-29 NOTE — PROGRESS NOTES
Neurosurgery    Called at 2:02 am and answered immediately. Reviewed CT 2:15 am. GLF in diabetic with hypertension. Has scattered sah in sylvian fissures with small left frontal parietal sdh without significant mass effect.   Patient to icu and follow up ct in am

## 2024-01-29 NOTE — ASSESSMENT & PLAN NOTE
Pt on ASA and plavix  TEG shows ASA inhibition at 41%  Transfused 1 unit platelets  Repeat TEG with improved inhibition   Hold Plavix and ASA per neurosurgery
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
## 2024-01-30 ENCOUNTER — APPOINTMENT (OUTPATIENT)
Dept: RADIOLOGY | Facility: MEDICAL CENTER | Age: 64
DRG: 085 | End: 2024-01-30
Attending: NURSE PRACTITIONER
Payer: OTHER MISCELLANEOUS

## 2024-01-30 ENCOUNTER — HOSPITAL ENCOUNTER (OUTPATIENT)
Dept: RADIOLOGY | Facility: MEDICAL CENTER | Age: 64
End: 2024-01-30
Attending: NURSE PRACTITIONER
Payer: MEDICARE

## 2024-01-30 PROBLEM — D64.9 ANEMIA: Status: ACTIVE | Noted: 2024-01-30

## 2024-01-30 LAB
ALBUMIN SERPL BCP-MCNC: 3.5 G/DL (ref 3.2–4.9)
ALBUMIN/GLOB SERPL: 1.1 G/DL
ALP SERPL-CCNC: 96 U/L (ref 30–99)
ALT SERPL-CCNC: 5 U/L (ref 2–50)
ANION GAP SERPL CALC-SCNC: 12 MMOL/L (ref 7–16)
AST SERPL-CCNC: 7 U/L (ref 12–45)
BASOPHILS # BLD AUTO: 0.3 % (ref 0–1.8)
BASOPHILS # BLD: 0.01 K/UL (ref 0–0.12)
BILIRUB SERPL-MCNC: 0.2 MG/DL (ref 0.1–1.5)
BUN SERPL-MCNC: 50 MG/DL (ref 8–22)
CALCIUM ALBUM COR SERPL-MCNC: 9.2 MG/DL (ref 8.5–10.5)
CALCIUM SERPL-MCNC: 8.8 MG/DL (ref 8.5–10.5)
CHLORIDE SERPL-SCNC: 99 MMOL/L (ref 96–112)
CO2 SERPL-SCNC: 29 MMOL/L (ref 20–33)
CREAT SERPL-MCNC: 6.73 MG/DL (ref 0.5–1.4)
EOSINOPHIL # BLD AUTO: 0.08 K/UL (ref 0–0.51)
EOSINOPHIL NFR BLD: 2.2 % (ref 0–6.9)
ERYTHROCYTE [DISTWIDTH] IN BLOOD BY AUTOMATED COUNT: 69.7 FL (ref 35.9–50)
GFR SERPLBLD CREATININE-BSD FMLA CKD-EPI: 6 ML/MIN/1.73 M 2
GLOBULIN SER CALC-MCNC: 3.1 G/DL (ref 1.9–3.5)
GLUCOSE BLD STRIP.AUTO-MCNC: 118 MG/DL (ref 65–99)
GLUCOSE BLD STRIP.AUTO-MCNC: 155 MG/DL (ref 65–99)
GLUCOSE BLD STRIP.AUTO-MCNC: 71 MG/DL (ref 65–99)
GLUCOSE BLD STRIP.AUTO-MCNC: 76 MG/DL (ref 65–99)
GLUCOSE BLD STRIP.AUTO-MCNC: 91 MG/DL (ref 65–99)
GLUCOSE SERPL-MCNC: 100 MG/DL (ref 65–99)
HCT VFR BLD AUTO: 25.1 % (ref 37–47)
HGB BLD-MCNC: 7.5 G/DL (ref 12–16)
IMM GRANULOCYTES # BLD AUTO: 0.01 K/UL (ref 0–0.11)
IMM GRANULOCYTES NFR BLD AUTO: 0.3 % (ref 0–0.9)
LYMPHOCYTES # BLD AUTO: 0.45 K/UL (ref 1–4.8)
LYMPHOCYTES NFR BLD: 12.5 % (ref 22–41)
MCH RBC QN AUTO: 27.2 PG (ref 27–33)
MCHC RBC AUTO-ENTMCNC: 29.9 G/DL (ref 32.2–35.5)
MCV RBC AUTO: 90.9 FL (ref 81.4–97.8)
MONOCYTES # BLD AUTO: 0.29 K/UL (ref 0–0.85)
MONOCYTES NFR BLD AUTO: 8.1 % (ref 0–13.4)
NEUTROPHILS # BLD AUTO: 2.75 K/UL (ref 1.82–7.42)
NEUTROPHILS NFR BLD: 76.6 % (ref 44–72)
NRBC # BLD AUTO: 0 K/UL
NRBC BLD-RTO: 0 /100 WBC (ref 0–0.2)
PLATELET # BLD AUTO: 159 K/UL (ref 164–446)
PMV BLD AUTO: 10.6 FL (ref 9–12.9)
POTASSIUM SERPL-SCNC: 4.7 MMOL/L (ref 3.6–5.5)
PROT SERPL-MCNC: 6.6 G/DL (ref 6–8.2)
RBC # BLD AUTO: 2.76 M/UL (ref 4.2–5.4)
SODIUM SERPL-SCNC: 140 MMOL/L (ref 135–145)
WBC # BLD AUTO: 3.6 K/UL (ref 4.8–10.8)

## 2024-01-30 PROCEDURE — 700102 HCHG RX REV CODE 250 W/ 637 OVERRIDE(OP): Performed by: SURGERY

## 2024-01-30 PROCEDURE — 93970 EXTREMITY STUDY: CPT

## 2024-01-30 PROCEDURE — 99232 SBSQ HOSP IP/OBS MODERATE 35: CPT | Performed by: NURSE PRACTITIONER

## 2024-01-30 PROCEDURE — 93970 EXTREMITY STUDY: CPT | Mod: 26 | Performed by: INTERNAL MEDICINE

## 2024-01-30 PROCEDURE — A9270 NON-COVERED ITEM OR SERVICE: HCPCS | Performed by: SURGERY

## 2024-01-30 PROCEDURE — 85025 COMPLETE CBC W/AUTO DIFF WBC: CPT

## 2024-01-30 PROCEDURE — 90935 HEMODIALYSIS ONE EVALUATION: CPT

## 2024-01-30 PROCEDURE — 99222 1ST HOSP IP/OBS MODERATE 55: CPT | Performed by: INTERNAL MEDICINE

## 2024-01-30 PROCEDURE — 99222 1ST HOSP IP/OBS MODERATE 55: CPT | Performed by: HOSPITALIST

## 2024-01-30 PROCEDURE — 700102 HCHG RX REV CODE 250 W/ 637 OVERRIDE(OP): Performed by: NURSE PRACTITIONER

## 2024-01-30 PROCEDURE — A9270 NON-COVERED ITEM OR SERVICE: HCPCS | Performed by: NURSE PRACTITIONER

## 2024-01-30 PROCEDURE — 80053 COMPREHEN METABOLIC PANEL: CPT

## 2024-01-30 PROCEDURE — 82962 GLUCOSE BLOOD TEST: CPT | Mod: 91

## 2024-01-30 PROCEDURE — 770001 HCHG ROOM/CARE - MED/SURG/GYN PRIV*

## 2024-01-30 PROCEDURE — 70450 CT HEAD/BRAIN W/O DYE: CPT

## 2024-01-30 RX ORDER — LEVETIRACETAM 500 MG/1
500 TABLET ORAL DAILY
Status: DISCONTINUED | OUTPATIENT
Start: 2024-01-31 | End: 2024-01-31 | Stop reason: HOSPADM

## 2024-01-30 RX ORDER — DEXTROSE MONOHYDRATE 25 G/50ML
25 INJECTION, SOLUTION INTRAVENOUS
Status: DISCONTINUED | OUTPATIENT
Start: 2024-01-30 | End: 2024-01-31 | Stop reason: HOSPADM

## 2024-01-30 RX ORDER — LORAZEPAM 2 MG/ML
0.5 INJECTION INTRAMUSCULAR ONCE
Status: DISCONTINUED | OUTPATIENT
Start: 2024-01-30 | End: 2024-01-30

## 2024-01-30 RX ORDER — LEVETIRACETAM 500 MG/5ML
500 INJECTION, SOLUTION, CONCENTRATE INTRAVENOUS DAILY
Status: DISCONTINUED | OUTPATIENT
Start: 2024-01-31 | End: 2024-01-31 | Stop reason: HOSPADM

## 2024-01-30 RX ADMIN — DORZOLAMIDE HYDROCHLORIDE AND TIMOLOL MALEATE 1 DROP: 20; 5 SOLUTION/ DROPS OPHTHALMIC at 18:07

## 2024-01-30 RX ADMIN — LATANOPROST 1 DROP: 50 SOLUTION OPHTHALMIC at 18:07

## 2024-01-30 RX ADMIN — BRIMONIDINE TARTRATE 1 DROP: 2 SOLUTION OPHTHALMIC at 05:40

## 2024-01-30 RX ADMIN — GABAPENTIN 100 MG: 100 CAPSULE ORAL at 05:38

## 2024-01-30 RX ADMIN — HYDRALAZINE HYDROCHLORIDE 25 MG: 25 TABLET ORAL at 05:38

## 2024-01-30 RX ADMIN — HYDROCHLOROTHIAZIDE 25 MG: 25 TABLET ORAL at 05:38

## 2024-01-30 RX ADMIN — ATORVASTATIN CALCIUM 40 MG: 40 TABLET, FILM COATED ORAL at 18:07

## 2024-01-30 RX ADMIN — SEVELAMER CARBONATE 800 MG: 800 TABLET, FILM COATED ORAL at 18:07

## 2024-01-30 RX ADMIN — DOCUSATE SODIUM 100 MG: 100 CAPSULE, LIQUID FILLED ORAL at 18:07

## 2024-01-30 RX ADMIN — SEVELAMER CARBONATE 800 MG: 800 TABLET, FILM COATED ORAL at 05:38

## 2024-01-30 RX ADMIN — BRIMONIDINE TARTRATE 1 DROP: 2 SOLUTION OPHTHALMIC at 14:08

## 2024-01-30 RX ADMIN — GABAPENTIN 100 MG: 100 CAPSULE ORAL at 18:06

## 2024-01-30 RX ADMIN — DORZOLAMIDE HYDROCHLORIDE AND TIMOLOL MALEATE 1 DROP: 20; 5 SOLUTION/ DROPS OPHTHALMIC at 05:40

## 2024-01-30 RX ADMIN — HYDRALAZINE HYDROCHLORIDE 25 MG: 25 TABLET ORAL at 18:06

## 2024-01-30 RX ADMIN — AMLODIPINE BESYLATE 10 MG: 10 TABLET ORAL at 05:38

## 2024-01-30 RX ADMIN — BRIMONIDINE TARTRATE 1 DROP: 2 SOLUTION OPHTHALMIC at 21:07

## 2024-01-30 ASSESSMENT — PAIN DESCRIPTION - PAIN TYPE
TYPE: ACUTE PAIN

## 2024-01-30 ASSESSMENT — FIBROSIS 4 INDEX: FIB4 SCORE: 1.22

## 2024-01-30 NOTE — ASSESSMENT & PLAN NOTE
I reviewed [facility]    Hypoglycemia protocol    Continue insulin sliding scale monitor

## 2024-01-30 NOTE — PROGRESS NOTES
Neurosurgery Progress Note    Subjective:  Pt sleepy, not appreciative of me bugging her. Denies h/a    Exam:  OE's to voice & stim, no fc's for me but purposeful, EASTMAN's    BP  Min: 102/59  Max: 162/79  Pulse  Av.5  Min: 48  Max: 64  Resp  Avg: 15  Min: 8  Max: 52  Temp  Av.3 °C (97.4 °F)  Min: 35.9 °C (96.7 °F)  Max: 36.8 °C (98.2 °F)  SpO2  Av.4 %  Min: 94 %  Max: 100 %    No data recorded    Recent Labs     24  2345 24  0544   WBC 4.6* 3.6*   RBC 3.20* 2.76*   HEMOGLOBIN 8.7* 7.5*   HEMATOCRIT 28.6* 25.1*   MCV 89.4 90.9   MCH 27.2 27.2   MCHC 30.4* 29.9*   RDW 68.5* 69.7*   PLATELETCT 136* 159*   MPV 9.5 10.6     Recent Labs     24  0111 24  0544   SODIUM 143 140   POTASSIUM 3.7 4.7   CHLORIDE 99 99   CO2 27 29   GLUCOSE 164* 100*   BUN 35* 50*   CREATININE 5.26* 6.73*   CALCIUM 9.4 8.8     Recent Labs     24   APTT 32.8   INR 1.02     Recent Labs     24  0219 24  0800   REACTMIN 5.4 4.7   CLOTKINET 0.9 0.8   CLOTANGL 77.3 77.9   MAXCLOTS 60.8 63.7   [id number]  --  0.5   PRCINADP 12.7 11.7   PRCINAA 48.9* 11.3*       Intake/Output                         24 07 - 24 0659 24 - 24 Total  Total                 Intake    P.O.  60  -- 60  --  -- --    P.O. 60 -- 60 -- -- --    Blood  105  -- 105  --  -- --    Volume (RELEASE PLATELET PHERESIS) 105 -- 105 -- -- --    Total Intake 165 -- 165 -- -- --       Output    Urine  0  -- 0  --  -- --    Number of Times Voided 0 x -- 0 x -- -- --    Urine Void (mL) 0 -- 0 -- -- --    Total Output 0 -- 0 -- -- --       Net I/O     165 -- 165 -- -- --              Intake/Output Summary (Last 24 hours) at 2024 0839  Last data filed at 2024 1800  Gross per 24 hour   Intake 55 ml   Output 0 ml   Net 55 ml             LORazepam  0.5 mg Once    insulin regular  1-6 Units 4X/DAY ACHS    And    dextrose bolus  25 g Q15 MIN PRN     Respiratory Therapy Consult   Continuous RT    Pharmacy Consult Request  1 Each PHARMACY TO DOSE    ondansetron  4 mg Q4HRS PRN    ondansetron  4 mg Q4HRS PRN    docusate sodium  100 mg BID    senna-docusate  1 Tablet Nightly    senna-docusate  1 Tablet Q24HRS PRN    polyethylene glycol/lytes  1 Packet BID    bisacodyl  10 mg Q24HRS PRN    acetaminophen  650 mg Q4HRS PRN    oxyCODONE immediate-release  2.5 mg Q3HRS PRN    Or    oxyCODONE immediate-release  5 mg Q3HRS PRN    Or    HYDROmorphone  0.25 mg Q3HRS PRN    levETIRAcetam  500 mg Q24HRS    Or    levETIRAcetam (Keppra) IV  500 mg Q24HRS    hydrALAZINE  10 mg Q4HRS PRN    brimonidine  1 Drop Q8HRS    dorzolamide-timolol  1 Drop BID    hydrALAZINE  25 mg BID    latanoprost  1 Drop Q EVENING    atorvastatin  40 mg Q EVENING    gabapentin  100 mg TID    metoprolol SR  100 mg Q DAY    amLODIPine  10 mg Q DAY    hydroCHLOROthiazide  25 mg Q DAY    sevelamer carbonate  800 mg TID WITH MEALS    lidocaine  1 mL PRN       Assessment and Plan:  POD #0 bilateral subarachnoid hemorrhage with a small subdural on the left.   NM as above  CT today - unchanged, stable by report  Prophylactic anticoagulation: no         Start date/time: will d/w Dr. Stevens

## 2024-01-30 NOTE — CARE PLAN
The patient is Stable - Low risk of patient condition declining or worsening    Shift Goals  Clinical Goals: stable neuro exam, decreased head bleed, maintain BP below goal, safety  Patient Goals: rest, eat food  Family Goals: improve, get better    Progress made toward(s) clinical / shift goals:  stable neuro exam, BP below goal, safety    Patient is not progressing towards the following goals: small increase in ICH       Problem: Pain - Standard  Goal: Alleviation of pain or a reduction in pain to the patient’s comfort goal  Outcome: Progressing  Note: 0-10 pain assessment tool in use to identify pain. Scheduled, PRN and nonpharmacologic measures in place to manage patients pain.        Problem: Risk for Aspiration  Goal: Patient's risk for aspiration will be absent or decrease  Outcome: Progressing  Note: SLP eval for swallow and cog. Pt currently eating dinner without signs of aspiration
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
## 2024-01-30 NOTE — ASSESSMENT & PLAN NOTE
Patient previously on aspirin and Plavix    Evaluated by neurosurgery with conservative management recommended  Repeat head CT reviewed stable  Continue close clinical monitoring and avoid sedating agents  If mental status not improved check EEG

## 2024-01-30 NOTE — PROGRESS NOTES
2100 - PT agitated, wants to go home, family in room, plan of care explained multiple times. PT is adamant that she wants to go home. Talked with family who indicated understanding. Discussed with daughter via phone who is primary caregiver.   Sarah Neff MD, 0.5mg ativan IVP ordered.   PT transferred from bed to chair with out incident.
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
## 2024-01-30 NOTE — THERAPY
Speech Language Therapy Contact Note    Patient Name: Ellen Nuñez  Age:  64 y.o., Sex:  female  Medical Record #: [id number]  Today's Date: 1/30/2024     RN reported poor likelihood of arousal 2/2 medication. Pt asleep upon arrival and did not awake despite multi-sensory cueing. Pt opened eyes x2 but did not maintain wakefulness. SLP to reattempt for cog eval as appropriate and as time allows.      01/30/24 1129   Treatment Variance   Reason For Missed Therapy Medical - Patient Unarousable;Medical - Patient not Able To Participate   Total Treatment Time   SLP Time Spent Yes   SLP Missed Visit (Mins) 5   Interdisciplinary Plan of Care Collaboration   IDT Collaboration with  Nursing;Speech Therapist   Collaboration Comments per RN, pt unarousable.     Shea Frankenberger, SLP Student
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
## 2024-01-30 NOTE — CARE PLAN
"The patient is Stable - Low risk of patient condition declining or worsening    Shift Goals  Clinical Goals: Repeat CT, Hemodynamic stability  Patient Goals: \"I want to go home\"  Family Goals: Safety, comfort.    Progress made toward(s) clinical / shift goals:  Repeat CT head stable, remains hemodynamically stable, free of injury      Problem: Pain - Standard  Goal: Alleviation of pain or a reduction in pain to the patient’s comfort goal  Outcome: Progressing     Problem: Knowledge Deficit - Standard  Goal: Patient and family/care givers will demonstrate understanding of plan of care, disease process/condition, diagnostic tests and medications  Outcome: Progressing     Problem: Psychosocial  Goal: Patient's level of anxiety will decrease  Outcome: Progressing  Goal: Patient's ability to verbalize feelings about condition will improve  Outcome: Progressing  Goal: Patient's ability to re-evaluate and adapt role responsibilities will improve  Outcome: Progressing  Goal: Patient and family will demonstrate ability to cope with life altering diagnosis and/or procedure  Outcome: Progressing  Goal: Spiritual and cultural needs incorporated into hospitalization  Outcome: Progressing     Problem: Hemodynamics  Goal: Patient's hemodynamics, fluid balance and neurologic status will be stable or improve  Outcome: Progressing     Problem: Respiratory  Goal: Patient will achieve/maintain optimum respiratory ventilation and gas exchange  Outcome: Progressing     Problem: Risk for Aspiration  Goal: Patient's risk for aspiration will be absent or decrease  Outcome: Progressing     Problem: Urinary - Renal Perfusion  Goal: Ability to achieve and maintain adequate renal perfusion and functioning will improve  Outcome: Progressing     Problem: Venous Thromboembolism (VTE) Prevention  Goal: The patient will remain free from venous thromboembolism (VTE)  Outcome: Progressing     Problem: Nutrition  Goal: Patient's nutritional and fluid " intake will be adequate or improve  Outcome: Progressing  Goal: Enteral nutrition will be maintained or improve  Outcome: Progressing  Goal: Enteral nutrition will be maintained or improve  Outcome: Progressing     Problem: Urinary Elimination  Goal: Establish and maintain regular urinary output  Outcome: Progressing     Problem: Bowel Elimination  Goal: Establish and maintain regular bowel function  Outcome: Progressing     Problem: Skin Integrity  Goal: Skin integrity is maintained or improved  Outcome: Progressing     Problem: Fall Risk  Goal: Patient will remain free from falls  Outcome: Progressing
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
## 2024-01-30 NOTE — ASSESSMENT & PLAN NOTE
Continue amlodipine metoprolol hydrochlorothiazide  Monitor blood pressure and adjust accordingly  As needed IV hydralazine for SBP greater than 160

## 2024-01-31 ENCOUNTER — PHARMACY VISIT (OUTPATIENT)
Dept: PHARMACY | Facility: MEDICAL CENTER | Age: 64
End: 2024-01-31
Payer: COMMERCIAL

## 2024-01-31 VITALS
TEMPERATURE: 97.9 F | BODY MASS INDEX: 20.78 KG/M2 | HEART RATE: 62 BPM | SYSTOLIC BLOOD PRESSURE: 103 MMHG | HEIGHT: 68 IN | OXYGEN SATURATION: 92 % | WEIGHT: 137.13 LBS | DIASTOLIC BLOOD PRESSURE: 60 MMHG | RESPIRATION RATE: 18 BRPM

## 2024-01-31 LAB
ALBUMIN SERPL BCP-MCNC: 3.5 G/DL (ref 3.2–4.9)
ALBUMIN/GLOB SERPL: 1.1 G/DL
ALP SERPL-CCNC: 98 U/L (ref 30–99)
ALT SERPL-CCNC: 6 U/L (ref 2–50)
ANION GAP SERPL CALC-SCNC: 12 MMOL/L (ref 7–16)
AST SERPL-CCNC: 16 U/L (ref 12–45)
BASOPHILS # BLD AUTO: 0.3 % (ref 0–1.8)
BASOPHILS # BLD: 0.02 K/UL (ref 0–0.12)
BILIRUB SERPL-MCNC: 0.3 MG/DL (ref 0.1–1.5)
BUN SERPL-MCNC: 31 MG/DL (ref 8–22)
CALCIUM ALBUM COR SERPL-MCNC: 9.4 MG/DL (ref 8.5–10.5)
CALCIUM SERPL-MCNC: 9 MG/DL (ref 8.5–10.5)
CHLORIDE SERPL-SCNC: 100 MMOL/L (ref 96–112)
CO2 SERPL-SCNC: 28 MMOL/L (ref 20–33)
CREAT SERPL-MCNC: 4.26 MG/DL (ref 0.5–1.4)
EOSINOPHIL # BLD AUTO: 0.05 K/UL (ref 0–0.51)
EOSINOPHIL NFR BLD: 0.6 % (ref 0–6.9)
ERYTHROCYTE [DISTWIDTH] IN BLOOD BY AUTOMATED COUNT: 69.8 FL (ref 35.9–50)
FERRITIN SERPL-MCNC: 1334 NG/ML (ref 10–291)
GFR SERPLBLD CREATININE-BSD FMLA CKD-EPI: 11 ML/MIN/1.73 M 2
GLOBULIN SER CALC-MCNC: 3.3 G/DL (ref 1.9–3.5)
GLUCOSE BLD STRIP.AUTO-MCNC: 102 MG/DL (ref 65–99)
GLUCOSE BLD STRIP.AUTO-MCNC: 95 MG/DL (ref 65–99)
GLUCOSE SERPL-MCNC: 101 MG/DL (ref 65–99)
HCT VFR BLD AUTO: 25.5 % (ref 37–47)
HGB BLD-MCNC: 7.8 G/DL (ref 12–16)
IMM GRANULOCYTES # BLD AUTO: 0.05 K/UL (ref 0–0.11)
IMM GRANULOCYTES NFR BLD AUTO: 0.6 % (ref 0–0.9)
IRON SATN MFR SERPL: 12 % (ref 15–55)
IRON SERPL-MCNC: 20 UG/DL (ref 40–170)
LYMPHOCYTES # BLD AUTO: 0.74 K/UL (ref 1–4.8)
LYMPHOCYTES NFR BLD: 9.3 % (ref 22–41)
MCH RBC QN AUTO: 27.6 PG (ref 27–33)
MCHC RBC AUTO-ENTMCNC: 30.6 G/DL (ref 32.2–35.5)
MCV RBC AUTO: 90.1 FL (ref 81.4–97.8)
MONOCYTES # BLD AUTO: 0.5 K/UL (ref 0–0.85)
MONOCYTES NFR BLD AUTO: 6.3 % (ref 0–13.4)
NEUTROPHILS # BLD AUTO: 6.56 K/UL (ref 1.82–7.42)
NEUTROPHILS NFR BLD: 82.9 % (ref 44–72)
NRBC # BLD AUTO: 0 K/UL
NRBC BLD-RTO: 0 /100 WBC (ref 0–0.2)
PLATELET # BLD AUTO: 134 K/UL (ref 164–446)
PMV BLD AUTO: 10.8 FL (ref 9–12.9)
POTASSIUM SERPL-SCNC: 4.5 MMOL/L (ref 3.6–5.5)
PROT SERPL-MCNC: 6.8 G/DL (ref 6–8.2)
RBC # BLD AUTO: 2.83 M/UL (ref 4.2–5.4)
SODIUM SERPL-SCNC: 140 MMOL/L (ref 135–145)
TEST NAME 95000: NORMAL
TIBC SERPL-MCNC: 161 UG/DL (ref 250–450)
UIBC SERPL-MCNC: 141 UG/DL (ref 110–370)
WBC # BLD AUTO: 7.9 K/UL (ref 4.8–10.8)

## 2024-01-31 PROCEDURE — 80053 COMPREHEN METABOLIC PANEL: CPT

## 2024-01-31 PROCEDURE — RXMED WILLOW AMBULATORY MEDICATION CHARGE: Performed by: STUDENT IN AN ORGANIZED HEALTH CARE EDUCATION/TRAINING PROGRAM

## 2024-01-31 PROCEDURE — 82962 GLUCOSE BLOOD TEST: CPT

## 2024-01-31 PROCEDURE — 99239 HOSP IP/OBS DSCHRG MGMT >30: CPT | Performed by: STUDENT IN AN ORGANIZED HEALTH CARE EDUCATION/TRAINING PROGRAM

## 2024-01-31 PROCEDURE — 700102 HCHG RX REV CODE 250 W/ 637 OVERRIDE(OP): Performed by: NURSE PRACTITIONER

## 2024-01-31 PROCEDURE — 85025 COMPLETE CBC W/AUTO DIFF WBC: CPT

## 2024-01-31 PROCEDURE — 82728 ASSAY OF FERRITIN: CPT

## 2024-01-31 PROCEDURE — A9270 NON-COVERED ITEM OR SERVICE: HCPCS | Performed by: SURGERY

## 2024-01-31 PROCEDURE — 83540 ASSAY OF IRON: CPT

## 2024-01-31 PROCEDURE — 83550 IRON BINDING TEST: CPT

## 2024-01-31 PROCEDURE — 36415 COLL VENOUS BLD VENIPUNCTURE: CPT

## 2024-01-31 PROCEDURE — 700102 HCHG RX REV CODE 250 W/ 637 OVERRIDE(OP): Performed by: SURGERY

## 2024-01-31 PROCEDURE — 700111 HCHG RX REV CODE 636 W/ 250 OVERRIDE (IP): Mod: JZ | Performed by: NURSE PRACTITIONER

## 2024-01-31 PROCEDURE — A9270 NON-COVERED ITEM OR SERVICE: HCPCS | Performed by: NURSE PRACTITIONER

## 2024-01-31 RX ORDER — LEVETIRACETAM 500 MG/1
500 TABLET ORAL DAILY
Qty: 4 TABLET | Refills: 0 | Status: SHIPPED | OUTPATIENT
Start: 2024-02-01 | End: 2024-02-05

## 2024-01-31 RX ADMIN — AMLODIPINE BESYLATE 10 MG: 10 TABLET ORAL at 05:27

## 2024-01-31 RX ADMIN — LEVETIRACETAM 500 MG: 100 INJECTION, SOLUTION, CONCENTRATE INTRAVENOUS at 05:26

## 2024-01-31 RX ADMIN — HYDROCHLOROTHIAZIDE 25 MG: 25 TABLET ORAL at 05:27

## 2024-01-31 RX ADMIN — BRIMONIDINE TARTRATE 1 DROP: 2 SOLUTION OPHTHALMIC at 05:29

## 2024-01-31 RX ADMIN — GABAPENTIN 100 MG: 100 CAPSULE ORAL at 05:28

## 2024-01-31 RX ADMIN — SEVELAMER CARBONATE 800 MG: 800 TABLET, FILM COATED ORAL at 16:49

## 2024-01-31 RX ADMIN — SEVELAMER CARBONATE 800 MG: 800 TABLET, FILM COATED ORAL at 09:20

## 2024-01-31 RX ADMIN — DOCUSATE SODIUM 100 MG: 100 CAPSULE, LIQUID FILLED ORAL at 05:28

## 2024-01-31 RX ADMIN — BRIMONIDINE TARTRATE 1 DROP: 2 SOLUTION OPHTHALMIC at 14:10

## 2024-01-31 RX ADMIN — METOPROLOL SUCCINATE 100 MG: 25 TABLET, EXTENDED RELEASE ORAL at 05:28

## 2024-01-31 RX ADMIN — DORZOLAMIDE HYDROCHLORIDE AND TIMOLOL MALEATE 1 DROP: 20; 5 SOLUTION/ DROPS OPHTHALMIC at 05:29

## 2024-01-31 RX ADMIN — HYDRALAZINE HYDROCHLORIDE 25 MG: 25 TABLET ORAL at 05:28

## 2024-01-31 RX ADMIN — POLYETHYLENE GLYCOL 3350 1 PACKET: 17 POWDER, FOR SOLUTION ORAL at 05:28

## 2024-01-31 RX ADMIN — GABAPENTIN 100 MG: 100 CAPSULE ORAL at 12:15

## 2024-01-31 ASSESSMENT — PAIN DESCRIPTION - PAIN TYPE: TYPE: ACUTE PAIN
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
## 2024-01-31 NOTE — PROGRESS NOTES
4 Eyes Skin Assessment Completed by Carmen Thakkar, RN and JASON Cortez.    Head WDL  Ears WDL  Nose WDL  Mouth WDL  Neck WDL  Breast/Chest WDL  Shoulder Blades WDL  Spine WDL  (R) Arm/Elbow/Hand WDL  (L) Arm/Elbow/Hand WDL  Abdomen WDL  Groin WDL  Scrotum/Coccyx/Buttocks WDL  (R) Leg AKA  (L) Leg Scar  (R) Heel/Foot/Toe AKA  (L) Heel/Foot/Toe Scar          Devices In Places Blood Pressure Cuff      Interventions In Place Pillows and Barrier Cream    Possible Skin Injury No    Pictures Uploaded Into Epic N/A  Wound Consult Placed N/A  RN Wound Prevention Protocol Ordered No

## 2024-01-31 NOTE — CARE PLAN
The patient is Stable - Low risk of patient condition declining or worsening    Shift Goals  Clinical Goals: Dialysis, neuro checks stable  Patient Goals: Sleep  Family Goals: make plan of care to take patient home    Progress made toward(s) clinical / shift goals:    Problem: Pain - Standard  Goal: Alleviation of pain or a reduction in pain to the patient’s comfort goal  Outcome: Progressing     Problem: Knowledge Deficit - Standard  Goal: Patient and family/care givers will demonstrate understanding of plan of care, disease process/condition, diagnostic tests and medications  Outcome: Progressing     Problem: Hemodynamics  Goal: Patient's hemodynamics, fluid balance and neurologic status will be stable or improve  Outcome: Progressing     Problem: Respiratory  Goal: Patient will achieve/maintain optimum respiratory ventilation and gas exchange  Outcome: Progressing     Problem: Skin Integrity  Goal: Skin integrity is maintained or improved  Outcome: Progressing     Problem: Fall Risk  Goal: Patient will remain free from falls  Outcome: Progressing       Patient is not progressing towards the following goals:

## 2024-01-31 NOTE — PROGRESS NOTES
1230: pt agitated, refused to take her breakfast. Pt angry because her daughter, Trudy, left without speaking with her. Assisted pt to call her daughter. Called kitchen for her early lunch tray per pt request. Pt refused taking her medication. Education given . Pt continued to refuse. Trudy acknowledged.  Will CTM.

## 2024-01-31 NOTE — PROGRESS NOTES
Neurosurgery Progress Note    Subjective:  No acute events over night  Sitting up in a chair    Exam:  A/Ox4  Slurry speech  EASTMAN, no focal deficits, right BKA    BP  Min: 108/56  Max: 150/81  Pulse  Av.5  Min: 53  Max: 86  Resp  Av.4  Min: 9  Max: 22  Temp  Av.3 °C (97.3 °F)  Min: 36.1 °C (96.9 °F)  Max: 36.7 °C (98 °F)  SpO2  Av.7 %  Min: 94 %  Max: 100 %    No data recorded    Recent Labs     24  2345 24  0544   WBC 4.6* 3.6*   RBC 3.20* 2.76*   HEMOGLOBIN 8.7* 7.5*   HEMATOCRIT 28.6* 25.1*   MCV 89.4 90.9   MCH 27.2 27.2   MCHC 30.4* 29.9*   RDW 68.5* 69.7*   PLATELETCT 136* 159*   MPV 9.5 10.6       Recent Labs     24  0111 24  0544   SODIUM 143 140   POTASSIUM 3.7 4.7   CHLORIDE 99 99   CO2 27 29   GLUCOSE 164* 100*   BUN 35* 50*   CREATININE 5.26* 6.73*   CALCIUM 9.4 8.8       Recent Labs     24   APTT 32.8   INR 1.02       Recent Labs     24  0219 24  0800   REACTMIN 5.4 4.7   CLOTKINET 0.9 0.8   CLOTANGL 77.3 77.9   MAXCLOTS 60.8 63.7   [id number]  --  0.5   PRCINADP 12.7 11.7   PRCINAA 48.9* 11.3*         Intake/Output                         24 0700 - 24 0659 24 07 - 24 0659      Total  Total                 Intake    P.O.  30  30 60  --  -- --    P.O. 30 30 60 -- -- --    Dialysis  500  -- 500  --  -- --    Dialysis Input (Dialysis Input / Output) 500 -- 500 -- -- --    Total Intake 530 30 560 -- -- --       Output    Urine  --  -- --  --  -- --    Number of Times Voided -- 1 x 1 x -- -- --    Dialysis  1500  -- 1500  --  -- --    Dialysis Output (Dialysis Input / Output) 1500 -- 1500 -- -- --    Stool  --  -- --  --  -- --    Number of Times Stooled 0 x -- 0 x -- -- --    Total Output 1500 -- 1500 -- -- --       Net I/O     -970 30 -940 -- -- --              Intake/Output Summary (Last 24 hours) at 2024 0937  Last data filed at 2024  Gross per 24 hour   Intake  560 ml   Output 1500 ml   Net -940 ml               insulin regular  1-6 Units 4X/DAY ACHS    And    dextrose bolus  25 g Q15 MIN PRN    levETIRAcetam  500 mg DAILY    Or    levETIRAcetam (Keppra) IV  500 mg DAILY    Respiratory Therapy Consult   Continuous RT    Pharmacy Consult Request  1 Each PHARMACY TO DOSE    ondansetron  4 mg Q4HRS PRN    ondansetron  4 mg Q4HRS PRN    docusate sodium  100 mg BID    senna-docusate  1 Tablet Nightly    senna-docusate  1 Tablet Q24HRS PRN    polyethylene glycol/lytes  1 Packet BID    bisacodyl  10 mg Q24HRS PRN    acetaminophen  650 mg Q4HRS PRN    oxyCODONE immediate-release  2.5 mg Q3HRS PRN    Or    oxyCODONE immediate-release  5 mg Q3HRS PRN    hydrALAZINE  10 mg Q4HRS PRN    brimonidine  1 Drop Q8HRS    dorzolamide-timolol  1 Drop BID    hydrALAZINE  25 mg BID    latanoprost  1 Drop Q EVENING    atorvastatin  40 mg Q EVENING    gabapentin  100 mg TID    metoprolol SR  100 mg Q DAY    amLODIPine  10 mg Q DAY    hydroCHLOROthiazide  25 mg Q DAY    sevelamer carbonate  800 mg TID WITH MEALS    lidocaine  1 mL PRN       Assessment and Plan:  POD #0 bilateral subarachnoid hemorrhage with a small subdural on the left.     Prophylactic anticoagulation: no         Start date/time: 7 days after admission    NM as above  CT tyesterday - unchanged, stable by report  Mobilize and work with PT/OT    OK to discharge from our stand point once cleared medically/PT/OT  No ASA, NSAIDs for 10 days

## 2024-01-31 NOTE — PROGRESS NOTES
Pt's lunch tray delivered to pt. Full feed assist. Pt ate couple bites of her grilled chicken and refused eating, falling back to sleep.   Called pt's daughter, Grabiel, and left  regarding d/c plan. Will CTM

## 2024-01-31 NOTE — DISCHARGE INSTRUCTIONS
Diet    Diabetic Diet, renal diet    A Diabetic Diet can help you control your blood glucose, lower your risk of heart disease, improve your blood pressure and maintain a healthy weight.  Eating healthy foods, low in calories, fat and carbohydrates at the same time every day can help control your blood glucose. Carbohydrates can greatly affect your blood glucose levels.  Counting carbs is important to keep your blood glucose at a healthy level, especially if you use insulin or take certain oral diabetes medicines.  Avoid alcohol as it can cause a sudden decrease in blood glucose (hypoglycemia), especially if you use insulin or take certain oral diabetes medicines.    Activity    Resume Your Normal Activity    You may resume your normal activity as tolerated.  Rest as needed.          Discharge Instructions per Eriberto Martinez M.D.    Please follow-up with PCP as outpatient  Follow up with neurosurgery as outpatient  Please hold ASA, plavix, NSAID such as ibuprofen, aleve for at least 10 days. Discuss with PCP and neurosurgery at [facility] to decide when to resume ASA and plavix  Continue Keppra until 2/5/24  Resume dialysis per your routine schedule    Return to ER in the event of new or worsening symptoms. Please note importance of compliance and the patient has agreed to proceed with all medical recommendations and follow up plan indicated above. All medications come with benefits and risks. Risks may include permanent injury or death and these risks can be minimized with close reassessment and monitoring. Please make it to your scheduled follow ups with PCP, neurosurgery

## 2024-01-31 NOTE — THERAPY
Speech Language Therapy Contact Note    Patient Name: Ellen Nuñez  Age:  64 y.o., Sex:  female  Medical Record #: [id number]  Today's Date: 1/31/2024 01/31/24 1115   Treatment Variance   Reason For Missed Therapy Medical - Patient Unarousable   Total Treatment Time   SLP Time Spent Yes   SLP Missed Visit (Mins) 7   Initial Contact Note    Initial Contact Note  Order Received and Verified, Speech Therapy Evaluation in Progress with Full Report to Follow.   Interdisciplinary Plan of Care Collaboration   IDT Collaboration with  Nursing;Family / Caregiver   Collaboration Comments Cleared by RN to see patient for dysarthria/cognitive evaluation. Pt asleep in her wheelchair, unarousable for SLP, SLP student, RN student, and dtr. Daughter reports that pt's performance/participation on cognitive test after just waking up would not be representative. SLP to re-attempt as time allows to complete evaluation.     - Chastity Turcios, CCC-SLP

## 2024-01-31 NOTE — CARE PLAN
"The patient is Stable - Low risk of patient condition declining or worsening    Shift Goals  Clinical Goals: neuro stability; safety; pt satisfaction  Patient Goals: information; rest  Family Goals: claudia    Progress made toward(s) clinical / shift goals:    Pt is confused and irritable; when pt arrived to floor she was verbally hostile and contradictory; this nurse believes that pt needed reassurance that she would be taken seriously and have input into her care before she became cooperative; pt stated that she was unclear about what put her here in the hospital; otherwise oriented; pt allowed finger stick but insulin was a hard \"no\" for her; this nurse will continue to try to please pt w/in reasonable limits and let her know she is safe and her input matters; this nurse told pt that it looks as if she takes very good care of her skin; pt resting w/o observable distress; call bell and pt phone in reach; bed low and locked    Problem: Hemodynamics  Goal: Patient's hemodynamics, fluid balance and neurologic status will be stable or improve  1.  Monitor vital signs, pulse oximetry and cardiac monitor per provider order and/or policy  2.  Maintain blood pressure per provider order  3.  Hemodynamic monitoring per provider order  4.  Manage IV fluids and IV infusions  5.  Monitor intake and output  6.  Daily weights per unit policy or provider order  7.  Assess peripheral pulses and capillary refill  8.  Assess color and body temperature  9.  Position patient for maximum circulation/cardiac output  10. Monitor for signs/symptoms of excessive bleeding  11. Assess mental status, restlessness and changes in level of consciousness  12. Monitor temperature and report fever or hypothermia to provider immediately. Consideration of targeted temperature management.  Outcome: Progressing     Problem: Skin Integrity  Goal: Skin integrity is maintained or improved  1.  Assess and monitor skin integrity, appearance and/or " temperature  2.  Assess risk factors for impaired skin integrity and/or pressures ulcers  3.  Implement precautions to protect skin integrity in collaboration with interdisciplinary team  4.  Implement pressure ulcer prevention protocol if at risk for skin breakdown  5.  Confirm wound care consult if at risk for skin breakdown  6.  Ensure patient use of pressure relieving devices  (Low air loss bed, waffle overlay, heel protectors, ROHO cushion, etc)  Outcome: Progressing

## 2024-01-31 NOTE — PROGRESS NOTES
Pt arrived to unit; oriented to room and call bell; difficulty getting pt in bed initially d/t lack of a slideboard; pt was upset that we didn't have her personal slideboard; pt requested that this nurse read all notes to her starting from the first one; pt was advised that I could answer specific questions but that I did not have time capacity to read her the entire chart; I advised pt of the ease of access to MyChart; pt bed low and locked; pt states she wants ice cream, jello and juan crackers and that she eats what she wants outside the hospital

## 2024-01-31 NOTE — PROGRESS NOTES
0730: Pt refused to take her meds and demanded to get to the chair. This RN assisted pt up to chair with a student. Pivot technique in use. pt was unable to hold her gait up. Staff assist pressed. other staff were unable to go around lifting pt up, so this RN and the student had to put pt on the ground. pt was assisted back to bed. No injury noted. All precautions were in place.     1013 Updated the incident and pt's status to pt's daughter, Grabiel.

## 2024-02-01 NOTE — PROGRESS NOTES
"1720: pt's daughter, Grabiel, came to  the pt, but then refused to take pt home tonight d/t pt was lethargic and responded to voice only. Notified charge RN, Richelle    1800: Grabiel felt safe to take her mother home. She assisted pt up to wheelchair. Pt alert and oriented. Pt's MAR report printed and given to pt's daughter per request.    1810: pt irritable, agitated, and had aggressive verbally toward this RN while this RN went over discharge paperwork with her daughter. Grabiel apologized for her mother's behaviors and told her mother \"that was not nice to make comments like that\". Pt continued to make verbally abusive toward this RN.  All discharge questions are addressed. Grabiel requested help from staff to get her mother up to her car.    1830: the CNA and student, Radha, escorted pt and her daughter to their car. All belongings with pt and her daughter.     1840: pt was assisted by Radha, got in her car in front passenger seat, heading to [place]      "

## 2024-02-01 NOTE — PROGRESS NOTES
Late entry:    1650: pt refused BSFS and taking medications. Education given, pt continued to refuse. CTM

## 2024-02-06 NOTE — DISCHARGE PLANNING
MADHAVI received VM from  Rosita Siddiqi [phone] assigned to pt's EPS case. Rosita requesting an update on pt. Per chart review, there was no follow-up by Case Management once pt was transferred off TICU. MADHAVI called NICHOLE Siddiqi back and left a VM with update on pt's dc from our hospital.